# Patient Record
Sex: FEMALE | Race: WHITE | Employment: PART TIME | URBAN - METROPOLITAN AREA
[De-identification: names, ages, dates, MRNs, and addresses within clinical notes are randomized per-mention and may not be internally consistent; named-entity substitution may affect disease eponyms.]

---

## 2017-01-23 ENCOUNTER — OFFICE VISIT (OUTPATIENT)
Dept: OBGYN CLINIC | Age: 32
End: 2017-01-23

## 2017-01-23 VITALS
SYSTOLIC BLOOD PRESSURE: 107 MMHG | TEMPERATURE: 97.5 F | RESPIRATION RATE: 16 BRPM | DIASTOLIC BLOOD PRESSURE: 77 MMHG | HEIGHT: 64 IN | WEIGHT: 117.8 LBS | BODY MASS INDEX: 20.11 KG/M2 | HEART RATE: 62 BPM

## 2017-01-23 DIAGNOSIS — Z01.419 WELL WOMAN EXAM WITH ROUTINE GYNECOLOGICAL EXAM: Primary | ICD-10-CM

## 2017-01-23 DIAGNOSIS — N92.6 IRREGULAR MENSES: ICD-10-CM

## 2017-01-23 RX ORDER — VALACYCLOVIR HYDROCHLORIDE 500 MG/1
500 TABLET, FILM COATED ORAL DAILY
Qty: 90 TAB | Refills: 3 | Status: SHIPPED | OUTPATIENT
Start: 2017-01-23

## 2017-01-23 RX ORDER — VALACYCLOVIR HYDROCHLORIDE 500 MG/1
TABLET, FILM COATED ORAL DAILY
COMMUNITY
End: 2017-01-23 | Stop reason: SDUPTHER

## 2017-01-23 RX ORDER — MEDROXYPROGESTERONE ACETATE 10 MG/1
10 TABLET ORAL DAILY
Qty: 30 TAB | Refills: 3 | Status: SHIPPED | OUTPATIENT
Start: 2017-01-23

## 2017-01-23 NOTE — PROGRESS NOTES
NEW PATIENT EXAM  Young Adult Woman    SUBJECTIVE: Lucia Harmon is a 36 y.o. female who presents for annual exam. Complaints of iregular cycles. Patient's last menstrual period was 12/15/2016. GYN History  Menarche:13  Sexual History:  active  Sexually transmitted diseases/infections: NO    Last pap: >1 year  The prior Pap result: Normal.      History reviewed. No pertinent past medical history. History reviewed. No pertinent past surgical history. OB History     No data available        Family History   Problem Relation Age of Onset    Stroke Father     Hypertension Father     Other Father      brain aneurysm    Breast Cancer Maternal Grandmother     Breast Cancer Paternal Grandmother      Social History     Social History    Marital status: SINGLE     Spouse name: N/A    Number of children: N/A    Years of education: N/A     Occupational History    Not on file. Social History Main Topics    Smoking status: Never Smoker    Smokeless tobacco: Not on file    Alcohol use Yes    Drug use: No    Sexual activity: Yes     Partners: Male     Birth control/ protection: None     Other Topics Concern    Not on file     Social History Narrative    No narrative on file       Current Outpatient Prescriptions   Medication Sig Dispense Refill    SPIRONOLACTONE PO Take  by mouth.  valACYclovir (VALTREX) 500 mg tablet Take 1 Tab by mouth daily. 90 Tab 3    medroxyPROGESTERone (PROVERA) 10 mg tablet Take 1 Tab by mouth daily. 30 Tab 3         Review of Systems:   Complete review of systems reviewed from social and history data forms. Pertinent positives in HPI.       Objective:     Visit Vitals    /77 (BP 1 Location: Right arm, BP Patient Position: Sitting)    Pulse 62    Temp 97.5 °F (36.4 °C) (Oral)    Resp 16    Ht 5' 4\" (1.626 m)    Wt 117 lb 12.8 oz (53.4 kg)    LMP 12/15/2016    BMI 20.22 kg/m2       General:  alert, cooperative, no distress, appears stated age   Skin:  Normal. Lymph Nodes:  Cervical, supraclavicular, and axillary nodes normal.   Breast Exam: normal appearance, no masses or tenderness    Lungs:  clear to auscultation bilaterally   Heart:  regular rate and rhythm, S1, S2 normal, no murmur, click, rub or gallop   Abdomen: soft, non-tender. Bowel sounds normal. No masses,  no organomegaly   Back:  Costovertebral angle tenderness absent   Genitourinary: BUS normal. Introitus normal. Normal appearing vaginal epithelium, Vaginal discharge described as normal and physiologic.,  Normal cervix without lesions or tenderness, Uterus normal size anteverted. NT., Adnexa normal in size left and right without tenderness. Extremities:  extremities normal, atraumatic, no cyanosis or edema         ASSESSMENT:      ICD-10-CM ICD-9-CM    1. Well woman exam with routine gynecological exam Z01.419 V72.31 PAP IG, RFX HPV ASCU, 67&51,46([id number])   2. Irregular menses N92.6 626.4 medroxyPROGESTERone (PROVERA) 10 mg tablet          Follow-up Disposition:  Return in about 1 year (around 1/23/2018) for annual exam.    Today's care was reviewed and discussed with the patient. She expresses understanding and approval of today's plan.       Larisa Sr MD

## 2017-01-23 NOTE — MR AVS SNAPSHOT
Visit Information Date & Time Provider Department Dept. Phone Encounter #  
 1/23/2017  3:30 PM Brandon Beaulieu [phone] [id number] Follow-up Instructions Return in about 1 year (around 1/23/2018) for annual exam. Upcoming Health Maintenance Date Due  
 PAP AKA CERVICAL CYTOLOGY 10/10/2008 INFLUENZA AGE 9 TO ADULT 8/1/2016 Allergies as of 1/23/2017  Review Complete On: 1/23/2017 By: Desire Damian MD  
 No Known Allergies Current Immunizations  Never Reviewed No immunizations on file. Not reviewed this visit You Were Diagnosed With   
  
 Codes Comments Well woman exam with routine gynecological exam    -  Primary ICD-10-CM: R38.336 ICD-9-CM: V72.31 Irregular menses     ICD-10-CM: N92.6 ICD-9-CM: 626.4 Vitals BP Pulse Temp Resp Height(growth percentile) Weight(growth percentile) 107/77 (BP 1 Location: Right arm, BP Patient Position: Sitting) 62 97.5 °F (36.4 °C) (Oral) 16 5' 4\" (1.626 m) 117 lb 12.8 oz (53.4 kg) LMP BMI Smoking Status 12/15/2016 20.22 kg/m2 Never Smoker Vitals History BMI and BSA Data Body Mass Index Body Surface Area  
 20.22 kg/m 2 1.55 m 2 Preferred Pharmacy Pharmacy Name Phone [address] [place] [address] [phone] Your Updated Medication List  
  
   
This list is accurate as of: 1/23/17  4:29 PM.  Always use your most recent med list.  
  
  
  
  
 medroxyPROGESTERone 10 mg tablet Commonly known as:  PROVERA Take 1 Tab by mouth daily. SPIRONOLACTONE PO Take  by mouth. valACYclovir 500 mg tablet Commonly known as:  VALTREX Take 1 Tab by mouth daily. Prescriptions Sent to Pharmacy Refills  
 valACYclovir (VALTREX) 500 mg tablet 3 Sig: Take 1 Tab by mouth daily. Class: Normal  
 Pharmacy: [address] [place] [address] #: [phone]  Route: Oral  
 medroxyPROGESTERone (PROVERA) 10 mg tablet 3 Sig: Take 1 Tab by mouth daily. Class: Normal  
 Pharmacy: [facility] #: [phone] Route: Oral  
  
Follow-up Instructions Return in about 1 year (around 1/23/2018) for annual exam.  
  
  
Patient Instructions Learning About Planning for Future Pregnancy How can you plan for pregnancy? Even before you get pregnant, you can help make your pregnancy as healthy as possible. Take these steps: 
· See a doctor or certified nurse-midwife for an exam. Talk about the medicines, vitamins, and herbs you take. Discuss any health problems or concerns you have. · Don't take nonsteroidal anti-inflammatory drugs (NSAIDs). Examples of these are ibuprofen and aspirin. They can raise your risk of miscarriage. This risk is higher around the time you conceive or if you use them for more than a week. · Take a daily multivitamin or prenatal vitamin. Make sure it has folic acid. This will lower the chance of having a baby with a birth defect. · Keep track of your menstrual cycle. This is a good idea for a few reasons. It helps you know the best time to try to get pregnant. And it can help your doctor or midwife figure out when your baby is due and how it is growing. · Make healthy choices. Eat well. Avoid caffeine. Or cut back and only have 1 cup of coffee or tea a day. Avoid alcohol, cigarettes, and illegal drugs. Take only the medicines your doctor or midwife says are okay. · Get plenty of exercise. A strong body will make pregnancy and birth easier. It will also help you recover after the birth. And exercise can help improve your mood. What other exams or tests should you have? Before trying to get pregnant, take care of any other health concerns you might have. · If you have diabetes or high blood pressure or you are obese, talk to your doctor before you get pregnant.  Together, you can make a plan about how to control these health problems. · Talk with your doctor about any medicines you take. Find out if it is safe to keep taking them while you are pregnant. · See your dentist. Take care of any dental work you may need. · Get any vaccines you might need. They can help prevent birth defects, miscarriage, or stillbirth that can be caused by infections such as rubella or measles. Ask your doctor how long you should wait after you get a vaccine before you try to get pregnant. · Talk with your doctor about whether to have screening tests for diseases that are passed down through families (genetic disorders). These diseases include: ¨ Cystic fibrosis. ¨ Sickle cell disease. ¨ Ramana-Sachs disease. If you think you might be pregnant · You can use a home pregnancy test as soon as the first day of your first missed menstrual period. · As soon as you know you're pregnant, make an appointment with your doctor or certified nurse-midwife. Your first prenatal visit will provide information that can be used to check for any problems as your pregnancy progresses. Where can you learn more? Go to http://bonita-moustapha.info/. Enter K397 in the search box to learn more about \"Learning About Planning for Future Pregnancy. \" Current as of: May 30, 2016 Content Version: 11.1 © 6561-0565 [employer]. Care instructions adapted under license by [employer] (which disclaims liability or warranty for this information). If you have questions about a medical condition or this instruction, always ask your healthcare professional. [address] any warranty or liability for your use of this information. Introducing [facility]! Migdalia Marin introduces [employer] patient portal. Now you can access parts of your medical record, email your doctor's office, and request medication refills online.    
 
1. In your internet browser, go to [email or web address] 2. Click on the First Time User? Click Here link in the Sign In box. You will see the New Member Sign Up page. 3. Enter your [employer] Access Code exactly as it appears below. You will not need to use this code after youve completed the sign-up process. If you do not sign up before the expiration date, you must request a new code. · [employer] Access Code: [email or web address] Expires: 4/23/2017  3:51 PM 
 
4. Enter the last four digits of your Social Security Number (xxxx) and Date of Birth (mm/dd/yyyy) as indicated and click Submit. You will be taken to the next sign-up page. 5. Create a SandLink[employer] ID. This will be your [employer] login ID and cannot be changed, so think of one that is secure and easy to remember. 6. Create a [employer] password. You can change your password at any time. 7. Enter your Password Reset Question and Answer. This can be used at a later time if you forget your password. 8. Enter your e-mail address. You will receive e-mail notification when new information is available in [address] 9. Click Sign Up. You can now view and download portions of your medical record. 10. Click the Download Summary menu link to download a portable copy of your medical information. If you have questions, please visit the Frequently Asked Questions section of the [employer] website. Remember, [employer] is NOT to be used for urgent needs. For medical emergencies, dial 911. Now available from your iPhone and Android! Please provide this summary of care documentation to your next provider. If you have any questions after today's visit, please call [phone].

## 2017-01-23 NOTE — PROGRESS NOTES
Chief Complaint   Patient presents with    Gyn Exam     P states she was told by her previous OB  that she has PCOS, wants a second opinion. Pt request more information about PCOS, meds she should take etc. Pt states infrequent cycles 1-2 per year. Pt states off OCP 3/2016. Pt states LMP 12/2016.

## 2017-01-25 LAB
CYTOLOGIST CVX/VAG CYTO: NORMAL
CYTOLOGY CVX/VAG DOC THIN PREP: NORMAL
DX ICD CODE: NORMAL
LABCORP, 190119: NORMAL
Lab: NORMAL
OTHER STN SPEC: NORMAL
PATH REPORT.FINAL DX SPEC: NORMAL
STAT OF ADQ CVX/VAG CYTO-IMP: NORMAL

## 2017-02-28 RX ORDER — SPIRONOLACTONE 50 MG/1
50 TABLET, FILM COATED ORAL DAILY
Qty: 90 TAB | Refills: 3 | Status: SHIPPED | OUTPATIENT
Start: 2017-02-28